# Patient Record
Sex: FEMALE | Race: WHITE | Employment: FULL TIME | URBAN - METROPOLITAN AREA
[De-identification: names, ages, dates, MRNs, and addresses within clinical notes are randomized per-mention and may not be internally consistent; named-entity substitution may affect disease eponyms.]

---

## 2019-05-01 ENCOUNTER — HOSPITAL ENCOUNTER (EMERGENCY)
Age: 39
Discharge: HOME | End: 2019-05-01
Attending: EMERGENCY MEDICINE
Payer: COMMERCIAL

## 2019-05-01 VITALS
WEIGHT: 188 LBS | OXYGEN SATURATION: 98 % | TEMPERATURE: 99 F | BODY MASS INDEX: 25.47 KG/M2 | HEIGHT: 72 IN | RESPIRATION RATE: 16 BRPM | SYSTOLIC BLOOD PRESSURE: 132 MMHG | DIASTOLIC BLOOD PRESSURE: 98 MMHG | HEART RATE: 78 BPM

## 2019-05-01 DIAGNOSIS — H53.142 PHOTOPHOBIA, LEFT EYE: ICD-10-CM

## 2019-05-01 DIAGNOSIS — H57.02 ANISOCORIA: Primary | ICD-10-CM

## 2019-05-01 PROCEDURE — 99283 EMERGENCY DEPT VISIT LOW MDM: CPT

## 2019-05-01 PROCEDURE — 74011000250 HC RX REV CODE- 250: Performed by: EMERGENCY MEDICINE

## 2019-05-01 RX ORDER — BUPROPION HYDROCHLORIDE 150 MG/1
TABLET, EXTENDED RELEASE ORAL 2 TIMES DAILY
COMMUNITY

## 2019-05-01 RX ORDER — PROPARACAINE HYDROCHLORIDE 5 MG/ML
2 SOLUTION/ DROPS OPHTHALMIC
Status: COMPLETED | OUTPATIENT
Start: 2019-05-01 | End: 2019-05-01

## 2019-05-01 RX ADMIN — PROPARACAINE HYDROCHLORIDE 2 DROP: 5 SOLUTION/ DROPS OPHTHALMIC at 11:15

## 2019-05-01 NOTE — ED PROVIDER NOTES
[address] [facility]  EMERGENCY DEPARTMENT HISTORY AND PHYSICAL EXAM       Date: 2019   Patient Name: Herminia Mcnair   YOB: 1980  Medical Record Number: [id number]    HISTORY OF PRESENTING ILLNESS:     Herminia Mcnair is a 37 y.o. female presenting with the noted PMH to the ED c/o abnormally enlarged left pupil she noticed while putting on her make-up this morning. She denies pain or redness. She does not have a history of anisocoria but does report a history of migraines. Works as a . Primary Care Provider: Juana Yancey MD   Specialist:    Past Medical History:   Past Medical History:   Diagnosis Date    Allergic to sunlight     Depression     Migraine         Past Surgical History:   Past Surgical History:   Procedure Laterality Date    HX CHOLECYSTECTOMY          Social History:   Social History     Tobacco Use    Smoking status: Former Smoker     Last attempt to quit: 2011     Years since quittin.4    Smokeless tobacco: Never Used    Tobacco comment: stop    Substance Use Topics    Alcohol use: Yes     Comment: 2 to 3 drinks a week    Drug use: No        Allergies: Allergies   Allergen Reactions    Dynabac [Dirithromycin] Other (comments)     tongue swells    Pcn [Penicillins] Rash        REVIEW OF SYSTEMS:  Review of Systems   Constitutional: Negative for chills and fever. HENT: Negative for ear pain and sore throat. Eyes: Negative for photophobia, pain, discharge, redness, itching and visual disturbance. Left pupil enlargement   Respiratory: Negative for cough and shortness of breath. Cardiovascular: Negative for chest pain and palpitations. Gastrointestinal: Negative for abdominal pain, diarrhea, nausea and vomiting. Genitourinary: Negative for flank pain. Musculoskeletal: Negative for back pain and neck pain. Neurological: Negative for syncope and headaches.    Psychiatric/Behavioral: Negative for agitation. The patient is not nervous/anxious. PHYSICAL EXAM:  Vitals:    05/01/19 0955 05/01/19 1143   BP: (!) 154/101 (!) 132/98   Pulse: 84 78   Resp: 16 16   Temp: 99 °F (37.2 °C)    SpO2: 100% 98%   Weight: 85.3 kg (188 lb)    Height: 6' (1.829 m)        Physical Exam   Constitutional: She is oriented to person, place, and time. She appears well-developed and well-nourished. No distress. HENT:   Head: Normocephalic and atraumatic. Mouth/Throat: Oropharynx is clear and moist.   Eyes: Conjunctivae and EOM are normal. Right eye exhibits no discharge. Left eye exhibits no discharge. No scleral icterus. Right pupil 2 to 3 mm and left pupil 4 to 5 mm. Both equally reactive. Bilateral consensual light reflex is present. Patient has photophobia of the left eye. No ciliary flush or conjunctival erythema. Neck: Neck supple. No tracheal deviation present. Cardiovascular: Normal rate, regular rhythm and intact distal pulses. No murmur heard. Pulmonary/Chest: Effort normal and breath sounds normal. No respiratory distress. Abdominal: Soft. There is no tenderness. Musculoskeletal: Normal range of motion. She exhibits no edema or deformity. Neurological: She is alert and oriented to person, place, and time. No gross neuro deficit   Skin: Skin is warm and dry. No rash noted. She is not diaphoretic. Psychiatric: She has a normal mood and affect. Nursing note and vitals reviewed. Medications   proparacaine (OPTHAINE) 0.5 % ophthalmic solution 2 Drop (2 Drops Both Eyes Given by Provider 5/1/19 1110)       RESULTS:    Labs -   Labs Reviewed - No data to display    Radiologic Studies -  No results found. MEDICAL DECISION MAKING    DDX: iritis, glaucoma, physiologic anisocoria     78-year-old female with history of migraines presents with new onset left pupil dilation she noticed while putting make-up on this morning.  Her conjunctiva are normal in color, EOM normal and non-painful but patient does have isolated sharp pain with light exposure, appropriate direct and consensual light responses are present. She has no corneal haziness. Pressure in the left eye is 22 and pressure in the right eye is 20. Given normal pressures and no haziness to the cornea not concern for glaucoma. The patient has no ciliary flush so less likely to be iritis. Possibly physiologic anisocoria and recommended ophthalmology follow-up within 24 to 48 hours. Patient has no new complaints, changes, or physical findings. Results were reviewed with the patient. Pt's questions and concerns were addressed. Care plan was outlined, including follow-up with PCP/specialist and return precautions were discussed. Patient is felt to be stable for discharge at this time. Diagnosis   Clinical Impression:   1. Anisocoria    2. Photophobia, left eye           Follow-up Information     Follow up With Specialties Details Why Contact Info       Please follow-up with an ophthalmologist within 24 to 48 hours.            Discharge Medication List as of 5/1/2019 11:30 AM          _______________________________   Attestations:

## 2019-05-01 NOTE — DISCHARGE INSTRUCTIONS
Patient Education        Learning About Anisocoria  What is anisocoria? Anisocoria (say \"an-eye-soh-KOR-ee-uh) is a difference in the size of your pupils. The pupil is the black area in the center of your iris. The iris is the colored part of your eye. The iris controls the pupil to let the right amount of light into the eye. In bright light, the pupil narrows (constricts) to let in less light. In dim light, the pupil widens (dilates) to let in more light. When you have anisocoria, one of your pupils does not constrict or dilate as well as the other one. So the pupils look uneven. A slight difference in the size of the pupils is normal for some people. But in other cases this condition is the result of a medical problem. Examples include an injury to the eye, an infection, or nerve damage. What are the symptoms? Having different-sized pupils usually doesn't cause any symptoms. And by itself, it is rarely a cause for concern. Anisocoria is more likely to be the sign of a serious problem if it occurs along with other symptoms, such as:  · A droopy upper eyelid. · Eye pain. · A severe headache. · Vision problems, such as double vision. · Loss of vision. Tell your doctor right away if you have any of these symptoms. What can you expect when you have it? An eye doctor (ophthalmologist) will do a complete eye exam. The doctor will:  · See how your pupils respond to bright and dim light. · Look at the inside of your eyes. · Check how well your eyes focus. The doctor might ask to see old photos to find out how long your pupils have been uneven. You may have other tests to help rule out a serious cause of uneven pupils. For example, the doctor might check how your eyes respond to eye drops. Or you might have an imaging test, such as an MRI. If a medical problem is causing the difference in pupil size, your treatment will depend on the cause.  You will not need any treatment if your uneven pupils are not linked to a medical problem. Follow-up care is a key part of your treatment and safety. Be sure to make and go to all appointments, and call your doctor if you are having problems. It's also a good idea to know your test results and keep a list of the medicines you take. Where can you learn more? Go to http://martín-tracy.info/. Enter [id number] in the search box to learn more about \"Learning About Anisocoria. \"  Current as of: July 17, 2018  Content Version: 11.9  © 5157-3932 [employer]. Care instructions adapted under license by [employer] (which disclaims liability or warranty for this information). If you have questions about a medical condition or this instruction, always ask your healthcare professional. [address] any warranty or liability for your use of this information.

## 2021-03-18 PROBLEM — R73.01 IMPAIRED FASTING GLUCOSE: Status: ACTIVE | Noted: 2021-03-09
